# Patient Record
Sex: FEMALE | Race: WHITE | Employment: PART TIME | URBAN - METROPOLITAN AREA
[De-identification: names, ages, dates, MRNs, and addresses within clinical notes are randomized per-mention and may not be internally consistent; named-entity substitution may affect disease eponyms.]

---
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
## 2021-03-05 ENCOUNTER — PROCEDURE VISIT (OUTPATIENT)
Dept: AUDIOLOGY | Age: 21
End: 2021-03-05
Payer: COMMERCIAL

## 2021-03-05 ENCOUNTER — OFFICE VISIT (OUTPATIENT)
Dept: ENT CLINIC | Age: 21
End: 2021-03-05
Payer: COMMERCIAL

## 2021-03-05 VITALS
TEMPERATURE: 97.5 F | BODY MASS INDEX: 31.91 KG/M2 | HEIGHT: 61 IN | WEIGHT: 169 LBS | DIASTOLIC BLOOD PRESSURE: 80 MMHG | SYSTOLIC BLOOD PRESSURE: 120 MMHG

## 2021-03-05 DIAGNOSIS — Z96.22 S/P MYRINGOTOMY WITH INSERTION OF TUBE: ICD-10-CM

## 2021-03-05 DIAGNOSIS — H69.81 ETD (EUSTACHIAN TUBE DYSFUNCTION), RIGHT: ICD-10-CM

## 2021-03-05 DIAGNOSIS — H69.81 ETD (EUSTACHIAN TUBE DYSFUNCTION), RIGHT: Primary | ICD-10-CM

## 2021-03-05 PROCEDURE — G8417 CALC BMI ABV UP PARAM F/U: HCPCS | Performed by: OTOLARYNGOLOGY

## 2021-03-05 PROCEDURE — 1036F TOBACCO NON-USER: CPT | Performed by: OTOLARYNGOLOGY

## 2021-03-05 PROCEDURE — 92567 TYMPANOMETRY: CPT | Performed by: AUDIOLOGIST

## 2021-03-05 PROCEDURE — 99213 OFFICE O/P EST LOW 20 MIN: CPT | Performed by: OTOLARYNGOLOGY

## 2021-03-05 PROCEDURE — G8484 FLU IMMUNIZE NO ADMIN: HCPCS | Performed by: OTOLARYNGOLOGY

## 2021-03-05 PROCEDURE — G8427 DOCREV CUR MEDS BY ELIG CLIN: HCPCS | Performed by: OTOLARYNGOLOGY

## 2021-03-05 RX ORDER — FLUTICASONE PROPIONATE 50 MCG
2 SPRAY, SUSPENSION (ML) NASAL DAILY
Qty: 1 BOTTLE | Refills: 0 | Status: SHIPPED | OUTPATIENT
Start: 2021-03-05

## 2021-03-05 NOTE — PROGRESS NOTES
[facility] Otolaryngology  Dr. Colton Loya. Dalton Lucero. Ms.Ed        Patient Name:  Carola Mart  :  2000     CHIEF C/O:    Chief Complaint   Patient presents with    Ear Problem     3 month tube check no problems at this time       HISTORY OBTAINED FROM:  patient    HISTORY OF PRESENT ILLNESS:       Celena Luis is a 24y.o. year old female, here today for follow up of history of eustachian tube dysfunction status post open also to placement. No current complaints of difficulty swallowing shortness of breath stridor headache vision changes ear drainage. Patient can use Flonase without associated complaints of epistaxis or sore throat. Past Medical History:   Diagnosis Date    ADHD (attention deficit hyperactivity disorder)     Anxiety     Asthma     Reactive airway disease     only when sick,       Past Surgical History:   Procedure Laterality Date    MYRINGOTOMY  2004    bilaterally    MYRINGOTOMY Right 10/04/2018    MYRINGOTOMY Right 2020    MYRINGOTOMY TUBE INSERTION performed by Gianna Marrufo DO at [address] Right 2020    eustatian tube dilation with myringtomy tube insertion    HI CREATE EARDRUM OPENING,GEN ANESTH Right 10/4/2018    RIGHT MYRINGOTOMY TUBE INSERTION performed by Gianna Marrufo DO at [address] Right 2020    EUSTACHIAN TUBE DILATION - RIGHT.  WITH RIGHT MYRINGOTOMY TUBE PLACEMENT performed by Gianna Marrufo DO at [facility]       Current Outpatient Medications:     fluticasone (FLONASE) 50 MCG/ACT nasal spray, 2 sprays by Nasal route daily 2 sprays each nostril once daily, Disp: 1 Bottle, Rfl: 0    ADDERALL XR 25 MG capsule, take 1 capsule by mouth every morning, Disp: , Rfl:     ibuprofen (ADVIL;MOTRIN) 400 MG tablet, Take 400 mg by mouth every 6 hours as needed for Pain, Disp: , Rfl:     fluticasone (FLONASE) 50 MCG/ACT nasal spray, 2 sprays by Nasal route daily 2 sprays each nostril once a day, Disp: 1 Bottle, Rfl: 3    montelukast (SINGULAIR) 10 MG tablet, Take 1 tablet by mouth daily, Disp: 30 tablet, Rfl: 3    amphetamine-dextroamphetamine (ADDERALL XR) 20 MG extended release capsule, Take 20 mg by mouth every morning., Disp: , Rfl:     albuterol sulfate  (90 Base) MCG/ACT inhaler, Inhale 2 puffs into the lungs every 6 hours as needed for Wheezing Only takes as needed, Disp: , Rfl:     cetirizine (ZYRTEC) 10 MG tablet, Take 10 mg by mouth daily, Disp: , Rfl:     FLUoxetine (PROZAC) 20 MG capsule, Take 20 mg by mouth daily, Disp: , Rfl:   Other  Social History     Tobacco Use    Smoking status: Never Smoker    Smokeless tobacco: Never Used   Substance Use Topics    Alcohol use: No    Drug use: No     History reviewed. No pertinent family history. Review of Systems   Constitutional: Negative for chills and fever. HENT: Negative for congestion, ear discharge, ear pain, facial swelling and hearing loss. Respiratory: Negative for cough and shortness of breath. Cardiovascular: Negative for chest pain and palpitations. Gastrointestinal: Negative for vomiting. Skin: Negative for rash. Allergic/Immunologic: Negative for environmental allergies. Neurological: Negative for dizziness and headaches. Hematological: Does not bruise/bleed easily. All other systems reviewed and are negative. /80   Temp 97.5 °F (36.4 °C)   Ht 5' 1\" (1.549 m)   Wt 169 lb (76.7 kg)   BMI 31.93 kg/m²   Physical Exam  Vitals signs and nursing note reviewed. Constitutional:       Appearance: She is well-developed. HENT:      Head: Normocephalic and atraumatic. Right Ear: Ear canal and external ear normal. A PE tube is present. Tympanic membrane is not retracted. Tympanic membrane has normal mobility. Left Ear: Ear canal and external ear normal. No decreased hearing noted. No PE tube. Tympanic membrane is not retracted.  Tympanic membrane has normal mobility. Eyes:      Pupils: Pupils are equal, round, and reactive to light. Neck:      Musculoskeletal: Normal range of motion. Thyroid: No thyromegaly. Trachea: No tracheal deviation. Cardiovascular:      Rate and Rhythm: Normal rate. Pulmonary:      Effort: Pulmonary effort is normal. No respiratory distress. Musculoskeletal: Normal range of motion. Lymphadenopathy:      Cervical: No cervical adenopathy. Skin:     General: Skin is warm. Findings: No erythema. Neurological:      Mental Status: She is alert. Cranial Nerves: No cranial nerve deficit. IMPRESSION/PLAN:  Patient seen and examined, history of eustachian tube dysfunction with bilateral myringotomy tympanostomy placement, right tube in place and functioning normally left tube is out with normal tympanograms continue Flonase and follow-up in 3 to 4 months. Dr. Denise Gonzales.  Otolaryngology Facial Plastic Surgery  :  [facility] Otolaryngology/Facial Plastic Surgery Residency  Associate Clinical Professor:  ANTELMO Brennan  [facility]

## 2021-03-05 NOTE — PROGRESS NOTES
This patient was referred for tympanometric testing by Dr. Live Conley. Patient is being seen for a 3-month PE tube check, with no acute issues. Tympanometry revealed normal middle ear peak pressure and compliance, in the left ear. A seal could not be obtained, right ear. Ipsilateral acoustic reflexes were present, left ear and could not be tested, right ear at 1000Hz. The results were reviewed with the patient. Recommendations for follow up will be made pending physician consult.     Tracey Lombard, CCC/ABI  Audiologist  [id number]  NPI#:  [id number]

## 2021-03-19 ASSESSMENT — ENCOUNTER SYMPTOMS
FACIAL SWELLING: 0
COUGH: 0
SHORTNESS OF BREATH: 0
VOMITING: 0

## 2021-10-21 ENCOUNTER — OFFICE VISIT (OUTPATIENT)
Dept: ENT CLINIC | Age: 21
End: 2021-10-21
Payer: COMMERCIAL

## 2021-10-21 ENCOUNTER — PROCEDURE VISIT (OUTPATIENT)
Dept: AUDIOLOGY | Age: 21
End: 2021-10-21
Payer: COMMERCIAL

## 2021-10-21 VITALS
SYSTOLIC BLOOD PRESSURE: 126 MMHG | BODY MASS INDEX: 31.19 KG/M2 | HEART RATE: 99 BPM | DIASTOLIC BLOOD PRESSURE: 74 MMHG | WEIGHT: 169.5 LBS | HEIGHT: 62 IN

## 2021-10-21 DIAGNOSIS — H61.21 IMPACTED CERUMEN OF RIGHT EAR: ICD-10-CM

## 2021-10-21 DIAGNOSIS — H69.81 ETD (EUSTACHIAN TUBE DYSFUNCTION), RIGHT: ICD-10-CM

## 2021-10-21 DIAGNOSIS — H90.11 CONDUCTIVE HEARING LOSS OF RIGHT EAR WITH UNRESTRICTED HEARING OF LEFT EAR: ICD-10-CM

## 2021-10-21 DIAGNOSIS — H90.71 MIXED CONDUCTIVE AND SENSORINEURAL HEARING LOSS OF RIGHT EAR WITH UNRESTRICTED HEARING OF LEFT EAR: Primary | ICD-10-CM

## 2021-10-21 PROCEDURE — 99213 OFFICE O/P EST LOW 20 MIN: CPT | Performed by: NURSE PRACTITIONER

## 2021-10-21 PROCEDURE — G8427 DOCREV CUR MEDS BY ELIG CLIN: HCPCS | Performed by: NURSE PRACTITIONER

## 2021-10-21 PROCEDURE — G8417 CALC BMI ABV UP PARAM F/U: HCPCS | Performed by: NURSE PRACTITIONER

## 2021-10-21 PROCEDURE — G8484 FLU IMMUNIZE NO ADMIN: HCPCS | Performed by: NURSE PRACTITIONER

## 2021-10-21 PROCEDURE — 92567 TYMPANOMETRY: CPT | Performed by: AUDIOLOGIST

## 2021-10-21 PROCEDURE — 1036F TOBACCO NON-USER: CPT | Performed by: NURSE PRACTITIONER

## 2021-10-21 PROCEDURE — 92557 COMPREHENSIVE HEARING TEST: CPT | Performed by: AUDIOLOGIST

## 2021-10-21 RX ORDER — NORGESTIMATE AND ETHINYL ESTRADIOL 7DAYSX3 28
1 KIT ORAL
COMMUNITY

## 2021-10-21 RX ORDER — CLONIDINE HYDROCHLORIDE 0.1 MG/1
TABLET ORAL
COMMUNITY
Start: 2021-09-22

## 2021-10-21 NOTE — PROGRESS NOTES
[address] [facility] Otolaryngology  Dr. Alen Houser. Gareth Castellon. Ms.Ed        Patient Name:  Shima Sinclair  :  2000     CHIEF C/O:    Chief Complaint   Patient presents with    Other     Possible RT tube not in place, possible impac akshat cerumen, c/o dizziness, pain and loss of hearing,        HISTORY OBTAINED FROM:  patient, father    HISTORY OF PRESENT ILLNESS:       Felix Edgar is a 22y.o. year old female, here today for follow up of right ear fullness. Pain with fullness and muffled hearing in right ear for 1 month  Hx of tube in right ear from 1 year ago  Thinks it may be out  No drainage  No fevers or recent antibiotics  No hx of previous wax impactions  Ringing in right ear          Past Medical History:   Diagnosis Date    ADHD (attention deficit hyperactivity disorder)     Anxiety     Asthma     Reactive airway disease     only when sick,       Past Surgical History:   Procedure Laterality Date    MYRINGOTOMY  2004    bilaterally    MYRINGOTOMY Right 10/04/2018    MYRINGOTOMY Right 2020    MYRINGOTOMY TUBE INSERTION performed by Everardo Steen DO at [place] [address] Right 2020    eustatian tube dilation with myringtomy tube insertion    MT CREATE EARDRUM OPENING,GEN ANESTH Right 10/4/2018    RIGHT MYRINGOTOMY TUBE [facility] [address]    EUSTACHIAN TUBE DILATION - RIGHT.  WITH RIGHT MYRINGOTOMY TUBE PLACEMENT performed by Everardo Steen DO at [address] [place] [address]       Current Outpatient Medications:     Norgestim-Eth Estrad Triphasic (TRI-SPRINTEC) 0.18/0.215/0.25 MG-35 MCG TABS, Take 1 tablet by mouth, Disp: , Rfl:     cloNIDine (CATAPRES) 0.1 MG tablet, Take one half in AM and one and one half at bed time, Disp: , Rfl:     fluticasone (FLONASE) 50 MCG/ACT nasal spray, 2 sprays by Nasal route daily 2 sprays each nostril once daily, Disp: 1 Bottle, Rfl: 0    ADDERALL XR 30 MG capsule, 1 capsule. , Disp: , Rfl:     ibuprofen (ADVIL;MOTRIN) 400 MG tablet, Take 400 mg by mouth every 6 hours as needed for Pain, Disp: , Rfl:     montelukast (SINGULAIR) 10 MG tablet, Take 1 tablet by mouth daily, Disp: 30 tablet, Rfl: 3    albuterol sulfate  (90 Base) MCG/ACT inhaler, Inhale 2 puffs into the lungs every 6 hours as needed for Wheezing Only takes as needed, Disp: , Rfl:     cetirizine (ZYRTEC) 10 MG tablet, Take 10 mg by mouth daily, Disp: , Rfl:     fluticasone (FLONASE) 50 MCG/ACT nasal spray, 2 sprays by Nasal route daily 2 sprays each nostril once a day (Patient not taking: Reported on 10/21/2021), Disp: 1 Bottle, Rfl: 3    amphetamine-dextroamphetamine (ADDERALL XR) 20 MG extended release capsule, Take 20 mg by mouth every morning. (Patient not taking: Reported on 10/21/2021), Disp: , Rfl:     FLUoxetine (PROZAC) 20 MG capsule, Take 20 mg by mouth daily (Patient not taking: Reported on 10/21/2021), Disp: , Rfl:   Other  Social History     Tobacco Use    Smoking status: Never Smoker    Smokeless tobacco: Never Used   Vaping Use    Vaping Use: Never used   Substance Use Topics    Alcohol use: No    Drug use: No     History reviewed. No pertinent family history. Review of Systems   Constitutional: Negative. Negative for activity change and appetite change. HENT: Positive for ear pain and hearing loss. Negative for congestion, ear discharge, postnasal drip and rhinorrhea. Eyes: Negative. Respiratory: Negative. Negative for shortness of breath and stridor. Cardiovascular: Negative. Negative for chest pain and palpitations. Endocrine: Negative. Musculoskeletal: Negative. Skin: Negative. Neurological: Negative. Negative for dizziness. Hematological: Negative. Psychiatric/Behavioral: Negative.         /74   Pulse 99   Ht 5' 1.5\" (1.562 m)   Wt 169 lb 8 oz (76.9 kg)   BMI 31.51 kg/m²   Physical Exam  Constitutional:       Appearance: was removed without difficulty revealing patent PE tube in the right TM. Patient does exhibit right TMJ tenderness worse with movement of her jaw. At this time she will begin using warm compresses and NSAID medications for her TMJ discomfort. She will be referred to Dr. 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 She will follow up in 3 months. She will call for any new or worsening symptoms prior to her appointment with Dr. Marlyn Loyola.         BEATRIZ Peñaloza, FNP-C  [address] [place] [address], Nose and Throat    The information contained in this note has been dictated using drug and medical speech recognition software and may contain errors

## 2021-11-01 ASSESSMENT — ENCOUNTER SYMPTOMS
RESPIRATORY NEGATIVE: 1
RHINORRHEA: 0
SHORTNESS OF BREATH: 0
EYES NEGATIVE: 1
STRIDOR: 0

## 2021-11-07 NOTE — PROGRESS NOTES
NEW PATIENT VISIT  Chief Complaint   Patient presents with    Hearing Problem     new patient with hearing loss in right ear      History of Present Illness:     Maged Ramirez is a 22 y.o. female presenting with concern for right hearing loss and ETD; long history of ear issues, mostly in the right; noted to have had 5 sets of tubes in the right ear and 1 in the left; she has noticeable issues with hearing in her right ear. Last right tube was placed with eustachian tube dilation and has been in place since 02/2021. She has a constant runny nose and does not use any allergy medication    PMH: bilateral ETD, history of multiple sets of ear tubes               TOBACCO  Social History     Tobacco Use   Smoking Status Never Smoker   Smokeless Tobacco Never Used        ALCOHOL   Social History     Substance and Sexual Activity   Alcohol Use No        [address]   Social History     Substance and Sexual Activity   Drug Use No        CURRENT OUTPATIENT MEDICATIONS:   Outpatient Medications Marked as Taking for the 11/8/21 encounter (Office Visit) with Adarsh Meraz MD   Medication Sig Dispense Refill    Norgestim-Eth Estrad Triphasic (TRI-SPRINTEC) 0.18/0.215/0.25 MG-35 MCG TABS Take 1 tablet by mouth      cloNIDine (CATAPRES) 0.1 MG tablet Take one half in AM and one and one half at bed time      fluticasone (FLONASE) 50 MCG/ACT nasal spray 2 sprays by Nasal route daily 2 sprays each nostril once daily 1 Bottle 0    ADDERALL XR 30 MG capsule 1 capsule.        ibuprofen (ADVIL;MOTRIN) 400 MG tablet Take 400 mg by mouth every 6 hours as needed for Pain      montelukast (SINGULAIR) 10 MG tablet Take 1 tablet by mouth daily 30 tablet 3    albuterol sulfate  (90 Base) MCG/ACT inhaler Inhale 2 puffs into the lungs every 6 hours as needed for Wheezing Only takes as needed      cetirizine (ZYRTEC) 10 MG tablet Take 10 mg by mouth daily          ALLERGIES:   Allergies   Allergen Reactions    Other      Flu shot reaction , paralysis to arm       Timing Age of Onset childhood   Duration Increasing in Severity No   Days of work missed in last year n/a      Modifying Factors Seasonal variation No        Associated Symptoms Mouth breathing No    Communication concerns No    Halitosis No     Family History Family members with similar conditions No   Family history of bleeding concerns No   Family history of anesthia concerns No        Review of Symptoms:  I have reviewed the patient's medical history in detail; there are no changes to the history as noted in the electronic medical record. Temp 97.9 °F (36.6 °C)   Ht 5' 1.5\" (1.562 m)   Wt 173 lb (78.5 kg)   BMI 32.16 kg/m²     Physical Exam    Allergies Allergies   Allergen Reactions    Other      Flu shot reaction , paralysis to arm      Constitutional Retractions/cyanosis {No     Head and Face   Lesions or masses No  facies symmetrical Yes   Eyes Ocular motion with gaze alignment Yes   Ears Inspection: Scars, lesions or masses No   Otoscopy  EAC patent bilaterally without occlusion External ears normal. Canals clear. TM normal left; right with T-tube in place and TM appears healthy      Nasal Inspection    No external Scars, lesions or masses    Pyriform apertures widely patent    Nasal musosa healthy   Septum Midline, no Septal Perforation, no septal hematoma   Turbinates Intact, healthy    Oral Cavity Lips no lesions    Teeth healthy without cavities    Gums no lesions    Oral mucosa healthy    Hard and Soft Palate no lesions    Uvula single fid     Tongue no lesions    Tonsils absent bilaterally Symmetric without exudate   Neck . Neck supple without tenderness or crepitus   Lymph  Cranial Nerve exam No palpable adenopathy  Grossly intact. CN VII symmetrical   Respiration Symmetric without Increased work of breathing    Cardiovacular No Cyanosis    Skin healthy   Diagnostics    Test ordered No orders of the defined types were placed in this encounter.      Review of existing tests Lab Results   Component Value Date/Time    WBC 7.2 07/18/2011 06:45 PM    HGB 12.6 07/18/2011 06:45 PM    HCT 36.0 07/18/2011 06:45 PM     07/18/2011 06:45 PM    MCV 84.6 07/18/2011 06:45 PM    MCH 29.6 07/18/2011 06:45 PM    MCHC 35.0 07/18/2011 06:45 PM    RDW 12.2 07/18/2011 06:45 PM        Old records  Reviewed   Discussion with other providers    Done     On this date 11/8/2021 I have spent 10 minutes reviewing previous notes, test results and 30 minutes face to face with the patient discussing the diagnosis and importance of compliance with the treatment plan as well as documenting on the day of the visit. A/P  Impression / Plan:     Alyson Mortensen is a 22 y.o.  female with right low frequency conductive hearing lossconfirmed by audiogram and examination, who will benefit from imaging.  The rest of the exam was benign      Patient will benefit from CT temporal bone  She is very interested in a right hearing aid, and will have to see if she can be a candidate as the rest of her hearing is normal  Allergy referral   Plan based on the results of the testing   Old records were reviewed     Lianne Inman MD 11/7/21 1:22 PM EST

## 2021-11-08 ENCOUNTER — OFFICE VISIT (OUTPATIENT)
Dept: ENT CLINIC | Age: 21
End: 2021-11-08
Payer: COMMERCIAL

## 2021-11-08 ENCOUNTER — TELEPHONE (OUTPATIENT)
Dept: ENT CLINIC | Age: 21
End: 2021-11-08

## 2021-11-08 VITALS — HEIGHT: 62 IN | BODY MASS INDEX: 31.83 KG/M2 | TEMPERATURE: 97.9 F | WEIGHT: 173 LBS

## 2021-11-08 DIAGNOSIS — H90.2 CONDUCTIVE HEARING LOSS, UNSPECIFIED LATERALITY: Primary | ICD-10-CM

## 2021-11-08 DIAGNOSIS — H69.83 DYSFUNCTION OF BOTH EUSTACHIAN TUBES: ICD-10-CM

## 2021-11-08 PROCEDURE — 99215 OFFICE O/P EST HI 40 MIN: CPT | Performed by: OTOLARYNGOLOGY

## 2021-11-08 NOTE — PATIENT INSTRUCTIONS
Will fax allergy order to , they will call for appointment. Address: [address]  Phone: [phone]        Call [phone] after CT is done for tele-visit results.

## 2021-11-08 NOTE — TELEPHONE ENCOUNTER
[facility]. They are to contact patient with appointment. Also faxed allergy referral to Dr. Rachana Lloyd, they too are to reach out to patient to schedule appointment.

## 2021-11-16 ENCOUNTER — HOSPITAL ENCOUNTER (OUTPATIENT)
Dept: CT IMAGING | Age: 21
Discharge: HOME | End: 2021-11-16
Payer: COMMERCIAL

## 2021-11-16 DIAGNOSIS — H90.2 CONDUCTIVE HEARING LOSS, UNSPECIFIED LATERALITY: ICD-10-CM

## 2021-11-16 PROCEDURE — 70480 CT ORBIT/EAR/FOSSA W/O DYE: CPT

## 2021-11-18 ENCOUNTER — TELEPHONE (OUTPATIENT)
Dept: ENT CLINIC | Age: 21
End: 2021-11-18

## 2021-12-14 ENCOUNTER — TELEPHONE (OUTPATIENT)
Dept: ENT CLINIC | Age: 21
End: 2021-12-14

## 2021-12-14 NOTE — TELEPHONE ENCOUNTER
Dr Braulio Hinojosa office called, pt was scheduled for appt on 12/6, however they cancelled, since then tried to reach out 3 times with no call back, they balwinder no longer be calling to schedule appt.       Electronically signed by Garth Lemus on 12/14/2021 at 2:34 PM

## 2023-09-11 ENCOUNTER — PROCEDURE VISIT (OUTPATIENT)
Dept: AUDIOLOGY | Age: 23
End: 2023-09-11
Payer: COMMERCIAL

## 2023-09-11 ENCOUNTER — OFFICE VISIT (OUTPATIENT)
Dept: ENT CLINIC | Age: 23
End: 2023-09-11
Payer: COMMERCIAL

## 2023-09-11 VITALS
BODY MASS INDEX: 33.27 KG/M2 | HEART RATE: 83 BPM | SYSTOLIC BLOOD PRESSURE: 129 MMHG | DIASTOLIC BLOOD PRESSURE: 85 MMHG | WEIGHT: 180.8 LBS | HEIGHT: 62 IN

## 2023-09-11 DIAGNOSIS — H90.71 MIXED CONDUCTIVE AND SENSORINEURAL HEARING LOSS OF RIGHT EAR WITH UNRESTRICTED HEARING OF LEFT EAR: Primary | ICD-10-CM

## 2023-09-11 DIAGNOSIS — H91.8X9 ASYMMETRICAL HEARING LOSS: ICD-10-CM

## 2023-09-11 DIAGNOSIS — H90.3 SENSORINEURAL HEARING LOSS, ASYMMETRICAL: ICD-10-CM

## 2023-09-11 DIAGNOSIS — H69.81 ETD (EUSTACHIAN TUBE DYSFUNCTION), RIGHT: ICD-10-CM

## 2023-09-11 DIAGNOSIS — Z45.89 TYMPANOSTOMY TUBE CHECK: ICD-10-CM

## 2023-09-11 DIAGNOSIS — H69.81 CHRONIC DYSFUNCTION OF RIGHT EUSTACHIAN TUBE: Primary | ICD-10-CM

## 2023-09-11 PROCEDURE — 92567 TYMPANOMETRY: CPT | Performed by: AUDIOLOGIST

## 2023-09-11 PROCEDURE — G8417 CALC BMI ABV UP PARAM F/U: HCPCS | Performed by: NURSE PRACTITIONER

## 2023-09-11 PROCEDURE — 1036F TOBACCO NON-USER: CPT | Performed by: NURSE PRACTITIONER

## 2023-09-11 PROCEDURE — 99214 OFFICE O/P EST MOD 30 MIN: CPT | Performed by: NURSE PRACTITIONER

## 2023-09-11 PROCEDURE — 92557 COMPREHENSIVE HEARING TEST: CPT | Performed by: AUDIOLOGIST

## 2023-09-11 PROCEDURE — G8427 DOCREV CUR MEDS BY ELIG CLIN: HCPCS | Performed by: NURSE PRACTITIONER

## 2023-09-11 RX ORDER — ESCITALOPRAM OXALATE 20 MG/1
1 TABLET ORAL DAILY
COMMUNITY
Start: 2023-09-07

## 2023-09-11 ASSESSMENT — ENCOUNTER SYMPTOMS
RESPIRATORY NEGATIVE: 1
SHORTNESS OF BREATH: 0
STRIDOR: 0
EYES NEGATIVE: 1
RHINORRHEA: 0

## 2023-09-11 NOTE — PROGRESS NOTES
This patient was referred for audiometric and tympanometric testing by HANY Izaguirre due to a longstanding issue with chronic ETD, right ear and a decrease in hearing sensitivity, right ear. Patient has had a PE tube in the right ear, for several years. Patient is interested in a hearing aid, right ear,  if warranted. Audiometry using pure tone air and bone conduction testing revealed normal hearing sensitivity, left ear and borderline-normal-to-mild hearing loss, right ear. Reliability was good. Speech reception thresholds were in good agreement with the pure tone averages, bilaterally. Speech discrimination scores were 96%, right ear at 60dBHL and 100%, left ear at 45dBHL. Tympanometry revealed normal middle ear peak pressure and compliance, in the left ear. A seal could not be obtained, right ear. Ipsilateral acoustic reflexes were present, left ear and could not be tested, right ear at 1000Hz. The results were reviewed with the patient. Recommendations for follow up will be made pending physician consult.     Pineda Mariano CCC/ABI  Audiologist  [id number]  NPI#:  [id number]      Electronically signed by Jono Palacio on 9/11/2023 at 10:41 AM

## 2023-09-11 NOTE — PROGRESS NOTES
[facility]  Dr. Roni Cannon. Ms.Ed        Patient Name:  Perla Thapa  :  2000     CHIEF C/O:    Chief Complaint   Patient presents with    Follow-up     Patient for follow up on right hearing loss patient states that she has had severe right ear pain patient states that she has severe right ear drainage from right ear patient has more nasal drainage patient states that she has pain daily        HISTORY OBTAINED FROM:  patient    HISTORY OF PRESENT ILLNESS:       Roxaan Lozano is a 26y.o. year old female, here today for follow up of:       Hearing loss and eustachian tube dysfunction. Patient was last seen nearly 2 years ago by Dr. Jenni Vegas for her mixed conductive hearing loss. She states that since that time she has noticed a continuous decline in the hearing of the right ear. She denies any current pain in the ear however she does state several weeks ago she was having discomfort in the right ear. She has noticed intermittent drainage in the right ear and is unsure if the tube was placed nearly 3 years ago is still in place. She states that she is having increased difficulty hearing routinely increasing the volume on her radio and television. Is also more difficult to have conversations with people. Patient is interested in hearing aids even if pain out-of-pocket. She denies any dizziness.   She does have intermittent high-frequency nonpulsatile tinnitus           Past Medical History:   Diagnosis Date    ADHD (attention deficit hyperactivity disorder)     Anxiety     Asthma     Reactive airway disease     only when sick,       Past Surgical History:   Procedure Laterality Date    MYRINGOTOMY  2004    bilaterally    MYRINGOTOMY Right 10/04/2018    MYRINGOTOMY Right 2020    MYRINGOTOMY TUBE INSERTION performed by Dwain Guillen DO at [address] [place] Right 2020    eustatian tube dilation with myringtomy tube insertion    AK TYMPANOSTOMY

## 2023-10-13 NOTE — PROGRESS NOTES
CC:   Chief Complaint   Patient presents with    Follow-up     Discuss tubes/ MRI results      Maureen Milan is a 26 y.o. female last seen 12/2021 with progressive hearing loss;   Prior CT scan noted to have small debris in the middle ear space; MRI no IAC pathology on review, small left artefact per radiology, still has tube in the right ear for 3 years. Has not seen allergist yet                  PMH: 22 y.o. female presenting with concern for right hearing loss and ETD; long history of ear issues, mostly in the right; noted to have had 5 sets of tubes in the right ear and 1 in the left; she has noticeable issues with hearing in her right ear. Last right tube was placed with eustachian tube dilation and has been in place since 02/2021. She has a constant runny nose and does not use any allergy medication     PMH: bilateral ETD, history of multiple sets of ear tubes          PAST MEDICAL HISTORY:   Past Medical History:   Diagnosis Date    ADHD (attention deficit hyperactivity disorder)     Anxiety     Asthma     Reactive airway disease     only when sick,          PAST SURGICAL HISTORY:   Past Surgical History:   Procedure Laterality Date    MYRINGOTOMY  2004    bilaterally    MYRINGOTOMY Right 10/04/2018    MYRINGOTOMY Right 7/23/2020    MYRINGOTOMY TUBE INSERTION performed by Robb Garcia DO at [address] [place] Right 07/23/2020    eustatian tube dilation with myringtomy tube insertion    MI TYMPANOSTOMY GENERAL ANESTHESIA Right 10/4/2018    RIGHT MYRINGOTOMY TUBE INSERTION performed by Robb Garcia DO at [facility] Right 7/23/2020    EUSTACHIAN TUBE DILATION - RIGHT.  WITH RIGHT MYRINGOTOMY TUBE PLACEMENT performed by Robb Garcia DO at [address]:   Social History     Socioeconomic History    Marital status: Single     Spouse name: Not on file    Number of children: Not on file    Years of education: Not on

## 2023-10-16 ENCOUNTER — OFFICE VISIT (OUTPATIENT)
Dept: ENT CLINIC | Age: 23
End: 2023-10-16
Payer: COMMERCIAL

## 2023-10-16 VITALS
WEIGHT: 185 LBS | BODY MASS INDEX: 34.04 KG/M2 | RESPIRATION RATE: 18 BRPM | HEIGHT: 62 IN | DIASTOLIC BLOOD PRESSURE: 60 MMHG | HEART RATE: 80 BPM | OXYGEN SATURATION: 100 % | SYSTOLIC BLOOD PRESSURE: 109 MMHG

## 2023-10-16 DIAGNOSIS — H91.8X3 ASYMMETRICAL HEARING LOSS: Primary | ICD-10-CM

## 2023-10-16 DIAGNOSIS — H69.91 ETD (EUSTACHIAN TUBE DYSFUNCTION), RIGHT: ICD-10-CM

## 2023-10-16 PROCEDURE — G8417 CALC BMI ABV UP PARAM F/U: HCPCS | Performed by: OTOLARYNGOLOGY

## 2023-10-16 PROCEDURE — G8427 DOCREV CUR MEDS BY ELIG CLIN: HCPCS | Performed by: OTOLARYNGOLOGY

## 2023-10-16 PROCEDURE — 99215 OFFICE O/P EST HI 40 MIN: CPT | Performed by: OTOLARYNGOLOGY

## 2023-10-16 PROCEDURE — G8484 FLU IMMUNIZE NO ADMIN: HCPCS | Performed by: OTOLARYNGOLOGY

## 2023-10-16 PROCEDURE — 99417 PROLNG OP E/M EACH 15 MIN: CPT | Performed by: OTOLARYNGOLOGY

## 2023-10-16 PROCEDURE — 1036F TOBACCO NON-USER: CPT | Performed by: OTOLARYNGOLOGY

## 2023-10-16 RX ORDER — DIPHENHYDRAMINE HCL 25 MG
25 CAPSULE ORAL EVERY 6 HOURS PRN
COMMUNITY

## 2023-10-16 NOTE — PATIENT INSTRUCTIONS
Call insurance and verify hearing aid coverage and where it can be used. Once the allergiest calls and schedules you, call the office and we will see you 3 months after.

## 2023-10-17 ENCOUNTER — TELEPHONE (OUTPATIENT)
Dept: AUDIOLOGY | Age: 23
End: 2023-10-17

## 2023-10-17 NOTE — TELEPHONE ENCOUNTER
Patient was seen by [facility] 9/11 and by Otology 10/16. She is interested in getting hearing aids at [facility]. She reported she has insurance coverage, but did not ask where she could go for hearing aids. She will call her insurance and ask, and then call [facility] to schedule HAE.

## 2023-10-26 ENCOUNTER — TELEPHONE (OUTPATIENT)
Dept: AUDIOLOGY | Age: 23
End: 2023-10-26

## 2023-10-26 NOTE — TELEPHONE ENCOUNTER
Returned patient phone call regarding hearing aid acquisition process. Explained to patient that she will need to check her insurance, regarding permitted hearing aid providers, amount of coverage. Told patient that if  is an approved provider, she will need documentation of coverage and amount of coverage. Patient instructed to call [facility], with any questions, or if audiology information is needed, for hearing aid evaluation purposes, at an alternate site.     [place], CCC/A  Audiologist  [id number]  NPI#:  [id number]

## 2023-11-13 ENCOUNTER — PROCEDURE VISIT (OUTPATIENT)
Dept: AUDIOLOGY | Age: 23
End: 2023-11-13

## 2023-11-13 DIAGNOSIS — H69.91 ETD (EUSTACHIAN TUBE DYSFUNCTION), RIGHT: Primary | ICD-10-CM

## 2023-11-13 PROCEDURE — 99024 POSTOP FOLLOW-UP VISIT: CPT | Performed by: AUDIOLOGIST

## 2023-11-14 NOTE — PROGRESS NOTES
HEARING AID EVALUATION    Patient seen for hearing aid evaluation/selection. After consultation, the patient would like to try [employer] BIANKA hearing aids. The patient has commercial insurance. Hearing aids will be ordered. The patient was scheduled for a hearing aid fitting on 12/5/2023. Hearing aid order placed and confirmation scanned.     Ashly Lomeli CCC/ABI  Audiologist  [id number]  NPI#:  [id number]

## 2023-12-05 ENCOUNTER — PROCEDURE VISIT (OUTPATIENT)
Dept: AUDIOLOGY | Age: 23
End: 2023-12-05

## 2023-12-05 DIAGNOSIS — H90.3 SENSORINEURAL HEARING LOSS, BILATERAL: Primary | ICD-10-CM

## 2023-12-05 PROCEDURE — 99024 POSTOP FOLLOW-UP VISIT: CPT | Performed by: AUDIOLOGIST

## 2023-12-07 NOTE — PROGRESS NOTES
Fit with binaural  RITE  hearing aids. Instructed in use and care. Gave  battery charger, warranty information and scheduled  2 week check. Made following adjustments: N/A. Hearing aid contract/battery warning form reviewed and signed. Hearing aids paired to phone nevin. Patient was satisfied and will follow up on above date, unless problems arise.      Olinda Amador CCC/ABI  Audiologist  [id number]  NPI#:  [id number]      Electronically signed by Jono Harman on 12/7/2023 at 7:50 AM

## 2024-01-12 ENCOUNTER — PROCEDURE VISIT (OUTPATIENT)
Dept: AUDIOLOGY | Age: 24
End: 2024-01-12

## 2024-01-12 DIAGNOSIS — H69.91 ETD (EUSTACHIAN TUBE DYSFUNCTION), RIGHT: Primary | ICD-10-CM

## 2024-01-12 PROCEDURE — 99024 POSTOP FOLLOW-UP VISIT: CPT | Performed by: AUDIOLOGIST

## 2024-01-12 NOTE — PROGRESS NOTES
Patient seen for hearing aid check.  Requested slight increase in low frequency gain, due to some difficult with male voices.  Patient scheduled for 30 day hearing aid check on 2/2/24, in conjunction with billing.    Jarvis Grant CCC/A  Audiologist  [id number]  NPI#:  [id number]

## 2024-02-02 ENCOUNTER — PROCEDURE VISIT (OUTPATIENT)
Dept: AUDIOLOGY | Age: 24
End: 2024-02-02

## 2024-02-02 ENCOUNTER — TELEPHONE (OUTPATIENT)
Dept: AUDIOLOGY | Age: 24
End: 2024-02-02

## 2024-02-02 DIAGNOSIS — H90.0 CONDUCTIVE HEARING LOSS, BILATERAL: Primary | ICD-10-CM

## 2024-02-02 NOTE — TELEPHONE ENCOUNTER
Patient reported acute ear pain, right ear, that began about 2-3 days ago.  She feels her PE tube has fallen out.  She is requesting an ENT appointment, with Dr Morejon, at the [facility] site.  This information was forwarded to [facility] office staff.    Please call to schedule appointment.    Jarvis Grant CCC/A  Audiologist  [id number]  NPI#:  [id number]

## 2024-02-02 NOTE — PROGRESS NOTES
The patient came in for a 30 day hearing aid follow up with billing.  Patient reported they are doing well with the hearing aids.   Changes to hearing aids today: N/A. Counseled patient on: cleaning/changing domes/wax guards. Patient is satisfied with the hearing aids and therefore billing to be done today.    Patient to return as needed.    Patient reported acute ear pain, right ear, that began about 2-3 days ago.  She feels her PE tube has fallen out.  She is requesting an ENT appointment, with Dr Morejon, at the [facility] site.  This information was forwarded to [facility]NT office staff.    Jarvis Grant CCC/ABI  Audiologist  [id number]  NPI#:  [id number]      Electronically signed by Jono Dumas on 2/2/2024 at 11:50 AM

## 2024-02-06 NOTE — TELEPHONE ENCOUNTER
Called pt and scheduled pt with Dr. ALFREDO in March after review chart I advised pt she would need to call [facility] and schedule a appointment with Dr. Duval due to her being a patient of Dr. Duval. Pt understood. Canceled appointment with Dr. ALFREDO .

## 2024-03-11 ENCOUNTER — TELEPHONE (OUTPATIENT)
Dept: AUDIOLOGY | Age: 24
End: 2024-03-11

## 2024-03-11 NOTE — TELEPHONE ENCOUNTER
Returned patient call to troubleshoot right hearing aid.  Walked patient through changing the wax guard on the right hearing aid.  Still no sound.  Walked through resetting the hearing aid.  Still no sound.    Jarvis Grant CCC/A  Audiologist  [id number]  NPI#:  [id number]

## 2024-03-12 ENCOUNTER — PROCEDURE VISIT (OUTPATIENT)
Dept: AUDIOLOGY | Age: 24
End: 2024-03-12

## 2024-03-12 DIAGNOSIS — H69.91 ETD (EUSTACHIAN TUBE DYSFUNCTION), RIGHT: ICD-10-CM

## 2024-03-12 DIAGNOSIS — H90.0 CONDUCTIVE HEARING LOSS, BILATERAL: Primary | ICD-10-CM

## 2024-03-12 PROCEDURE — 99024 POSTOP FOLLOW-UP VISIT: CPT | Performed by: AUDIOLOGIST

## 2024-03-13 NOTE — PROGRESS NOTES
Patient seen for hearing aid check.  Right hearing aid not working.  Sent to [employer] for repair/replacement.    Jarvis Grant CCC/A  Audiologist  [id number]  NPI#:  [id number]

## 2024-04-01 ENCOUNTER — TELEPHONE (OUTPATIENT)
Dept: AUDIOLOGY | Age: 24
End: 2024-04-01

## 2024-04-01 NOTE — TELEPHONE ENCOUNTER
called patient and attempted to leave a voicemail message, regarding hearing aid repair pickup appointment.  Tried to leave a voicemail, however voicemail box is not set up.    Electronically signed by Jono Dumas on 4/1/2024 at 10:40 AM

## 2024-04-25 ENCOUNTER — PROCEDURE VISIT (OUTPATIENT)
Dept: AUDIOLOGY | Age: 24
End: 2024-04-25

## 2024-04-25 DIAGNOSIS — H90.A11 CONDUCTIVE HEARING LOSS OF RIGHT EAR WITH RESTRICTED HEARING OF LEFT EAR: Primary | ICD-10-CM

## 2024-04-25 PROCEDURE — 99024 POSTOP FOLLOW-UP VISIT: CPT | Performed by: AUDIOLOGIST

## 2024-04-25 NOTE — PROGRESS NOTES
Patient seen for hearing aid repair pickup (Phonak/right HA).    Hearing aids resynced to each other and phone nevin.    Patient to return PRN.    Jarvis Grant CCC/ABI  Audiologist  [id number]  NPI#:  [id number]

## 2024-04-29 ENCOUNTER — PROCEDURE VISIT (OUTPATIENT)
Dept: AUDIOLOGY | Age: 24
End: 2024-04-29

## 2024-04-29 DIAGNOSIS — H69.91 ETD (EUSTACHIAN TUBE DYSFUNCTION), RIGHT: Primary | ICD-10-CM

## 2024-04-29 PROCEDURE — 99024 POSTOP FOLLOW-UP VISIT: CPT | Performed by: AUDIOLOGIST

## 2024-04-29 NOTE — PROGRESS NOTES
Patient seen for programming adjustments to her right hearing aid.    No other issues noted.    Patient to return PRN.    Jarvis Grant CCC/A  Audiologist  [id number]  NPI#:  [id number]

## 2024-05-23 ENCOUNTER — PROCEDURE VISIT (OUTPATIENT)
Dept: AUDIOLOGY | Age: 24
End: 2024-05-23
Payer: COMMERCIAL

## 2024-05-23 ENCOUNTER — OFFICE VISIT (OUTPATIENT)
Dept: ENT CLINIC | Age: 24
End: 2024-05-23
Payer: COMMERCIAL

## 2024-05-23 VITALS
HEART RATE: 94 BPM | HEIGHT: 62 IN | WEIGHT: 154.4 LBS | SYSTOLIC BLOOD PRESSURE: 115 MMHG | DIASTOLIC BLOOD PRESSURE: 84 MMHG | BODY MASS INDEX: 28.41 KG/M2

## 2024-05-23 DIAGNOSIS — H69.91 ETD (EUSTACHIAN TUBE DYSFUNCTION), RIGHT: Primary | ICD-10-CM

## 2024-05-23 DIAGNOSIS — H91.8X3 ASYMMETRICAL HEARING LOSS: ICD-10-CM

## 2024-05-23 DIAGNOSIS — H90.71 MIXED CONDUCTIVE AND SENSORINEURAL HEARING LOSS OF RIGHT EAR WITH UNRESTRICTED HEARING OF LEFT EAR: ICD-10-CM

## 2024-05-23 DIAGNOSIS — Z01.818 PREOP TESTING: ICD-10-CM

## 2024-05-23 DIAGNOSIS — R55 SYNCOPE, UNSPECIFIED SYNCOPE TYPE: ICD-10-CM

## 2024-05-23 DIAGNOSIS — R42 VERTIGO: ICD-10-CM

## 2024-05-23 PROCEDURE — 92567 TYMPANOMETRY: CPT | Performed by: AUDIOLOGIST

## 2024-05-23 PROCEDURE — G8427 DOCREV CUR MEDS BY ELIG CLIN: HCPCS | Performed by: NURSE PRACTITIONER

## 2024-05-23 PROCEDURE — 99214 OFFICE O/P EST MOD 30 MIN: CPT | Performed by: NURSE PRACTITIONER

## 2024-05-23 PROCEDURE — 92542 POSITIONAL NYSTAGMUS TEST: CPT | Performed by: NURSE PRACTITIONER

## 2024-05-23 PROCEDURE — 1036F TOBACCO NON-USER: CPT | Performed by: NURSE PRACTITIONER

## 2024-05-23 PROCEDURE — G8417 CALC BMI ABV UP PARAM F/U: HCPCS | Performed by: NURSE PRACTITIONER

## 2024-05-23 ASSESSMENT — ENCOUNTER SYMPTOMS
EYES NEGATIVE: 1
RESPIRATORY NEGATIVE: 1
SHORTNESS OF BREATH: 0
STRIDOR: 0
RHINORRHEA: 0

## 2024-05-23 NOTE — PROGRESS NOTES
This patient was referred for tympanometric testing by HANY Perez due to eustachian tube dysfunction, right ear worse.      Tympanometry revealed normal middle ear peak pressure and compliance, bilaterally.    The results were reviewed with the patient.     Recommendations for follow up will be made pending physician consult.    Jarvis Grant CCC/ABI  Audiologist  [id number]  NPI#:  [id number]      Electronically signed by Jono Dumas on 5/23/2024 at 8:47 AM

## 2024-05-23 NOTE — PROGRESS NOTES
[facility]  Dr. Gonzalez Morejon D.O. Ms.Ed        Patient Name:  Dennis Mendez  :  2000     CHIEF C/O:    Chief Complaint   Patient presents with    Follow-up     FOLLOW UP EARS PATIENT WAS REFERRED TO DR OSHEA       HISTORY OBTAINED FROM:  patient    HISTORY OF PRESENT ILLNESS:       Dennis is a 23 y.o. year old female, here today for follow up of:       Chronic eustachian tube dysfunction hearing loss, dizziness.  Patient was last seen by Dr. Oshea approximately 7-8 months ago at which time they discussed possible removal of the right myringotomy tube.  Patient feels that the tube is out at this time but denies any ear pain or pressure.  She denies any new changes to her hearing is doing well with her current set of hearing aids.  She denies any congestion, rhinorrhea, postnasal drainage.  She does note the return of her symptoms of dizziness.  She states that it is a lightheaded sensation or faint sensation that happens when her arms are placed above her head and she stretches, also when changing positions from lying or sitting to standing at times.  She denies any loss of consciousness.  She denies any room spinning vertigo.  She denies any new tinnitus.           Past Medical History:   Diagnosis Date    ADHD (attention deficit hyperactivity disorder)     Anxiety     Asthma     Reactive airway disease     only when sick,       Past Surgical History:   Procedure Laterality Date    MYRINGOTOMY  2004    bilaterally    MYRINGOTOMY Right 10/04/2018    MYRINGOTOMY Right 2020    MYRINGOTOMY TUBE INSERTION performed by Gonzalez Morejon DO at [facility] OR    OTHER SURGICAL HISTORY Right 2020    eustatian tube dilation with myringtomy tube insertion    ID TYMPANOSTOMY GENERAL ANESTHESIA Right 10/4/2018    RIGHT MYRINGOTOMY TUBE INSERTION performed by Gonzalez Morejon DO at [facility] OR    TYMPANOSTOMY TUBE PLACEMENT Right 2020    EUSTACHIAN TUBE DILATION - RIGHT.

## 2024-06-18 ENCOUNTER — HOSPITAL ENCOUNTER (OUTPATIENT)
Dept: CT IMAGING | Age: 24
Discharge: HOME | End: 2024-06-18
Payer: COMMERCIAL

## 2024-06-18 DIAGNOSIS — R55 SYNCOPE, UNSPECIFIED SYNCOPE TYPE: ICD-10-CM

## 2024-06-18 DIAGNOSIS — R42 VERTIGO: ICD-10-CM

## 2024-06-18 PROCEDURE — 71275 CT ANGIOGRAPHY CHEST: CPT

## 2024-06-18 PROCEDURE — 6360000004 HC RX CONTRAST MEDICATION: Performed by: RADIOLOGY

## 2024-06-18 RX ADMIN — IOPAMIDOL 75 ML: 755 INJECTION, SOLUTION INTRAVENOUS at 08:50

## 2024-07-16 ENCOUNTER — TELEPHONE (OUTPATIENT)
Dept: ENT CLINIC | Age: 24
End: 2024-07-16

## 2024-07-16 NOTE — TELEPHONE ENCOUNTER
EST patient would like to reschedule missed 7/8/24  appt with Lul Wagoner.  Please contact patient to schedule.

## 2024-08-05 ENCOUNTER — OFFICE VISIT (OUTPATIENT)
Dept: ENT CLINIC | Age: 24
End: 2024-08-05
Payer: COMMERCIAL

## 2024-08-05 VITALS
SYSTOLIC BLOOD PRESSURE: 100 MMHG | HEART RATE: 98 BPM | DIASTOLIC BLOOD PRESSURE: 76 MMHG | HEIGHT: 62 IN | BODY MASS INDEX: 27.1 KG/M2 | WEIGHT: 147.3 LBS

## 2024-08-05 DIAGNOSIS — R55 SYNCOPE, UNSPECIFIED SYNCOPE TYPE: ICD-10-CM

## 2024-08-05 DIAGNOSIS — H91.8X3 ASYMMETRICAL HEARING LOSS: Primary | ICD-10-CM

## 2024-08-05 DIAGNOSIS — H90.71 MIXED CONDUCTIVE AND SENSORINEURAL HEARING LOSS OF RIGHT EAR WITH UNRESTRICTED HEARING OF LEFT EAR: ICD-10-CM

## 2024-08-05 DIAGNOSIS — R42 VERTIGO: ICD-10-CM

## 2024-08-05 PROCEDURE — 99214 OFFICE O/P EST MOD 30 MIN: CPT | Performed by: NURSE PRACTITIONER

## 2024-08-05 PROCEDURE — 1036F TOBACCO NON-USER: CPT | Performed by: NURSE PRACTITIONER

## 2024-08-05 PROCEDURE — G8427 DOCREV CUR MEDS BY ELIG CLIN: HCPCS | Performed by: NURSE PRACTITIONER

## 2024-08-05 PROCEDURE — G8417 CALC BMI ABV UP PARAM F/U: HCPCS | Performed by: NURSE PRACTITIONER

## 2024-08-05 ASSESSMENT — ENCOUNTER SYMPTOMS
RHINORRHEA: 0
RESPIRATORY NEGATIVE: 1
STRIDOR: 0
SHORTNESS OF BREATH: 0
EYES NEGATIVE: 1

## 2024-08-05 NOTE — PROGRESS NOTES
[facility]  Dr. Gonzalez Morejon, LEXX.O. Ms.Ed        Patient Name:  Dennis Mendez  :  2000     CHIEF C/O:    Chief Complaint   Patient presents with    Follow-up     1 mo CTA chest results (in epic) - complaints of frequent dizziness, has documented them and brought it for your review       HISTORY OBTAINED FROM:  patient    HISTORY OF PRESENT ILLNESS:       Dennis is a 24 y.o. year old female, here today for follow up of:       Dizziness and CT results.  Patient was last seen 6 weeks ago and underwent a CTA of the chest to rule out possible thoracic outlet syndrome in regards to her dizziness.  This test was negative.  Patient did keep a log of her symptoms since her last appointment with her symptoms being triggered by positional changes or strenuous activity.  It was also noted at her last appointment that she had a significant increase in heart rate with positional changes.  She denies any new ear pain or pressure.  She denies any sinus pain, pressure, congestion, rhinorrhea, postnasal drainage.           Past Medical History:   Diagnosis Date    ADHD (attention deficit hyperactivity disorder)     Anxiety     Asthma     Reactive airway disease     only when sick,       Past Surgical History:   Procedure Laterality Date    MYRINGOTOMY  2004    bilaterally    MYRINGOTOMY Right 10/04/2018    MYRINGOTOMY Right 2020    MYRINGOTOMY TUBE INSERTION performed by Gonzalez Morejon DO at [facility] OR    OTHER SURGICAL HISTORY Right 2020    eustatian tube dilation with myringtomy tube insertion    MO TYMPANOSTOMY GENERAL ANESTHESIA Right 10/4/2018    RIGHT MYRINGOTOMY TUBE INSERTION performed by Gonzalez Morejon DO at [facility] OR    TYMPANOSTOMY TUBE PLACEMENT Right 2020    EUSTACHIAN TUBE DILATION - RIGHT. WITH RIGHT MYRINGOTOMY TUBE PLACEMENT performed by Gonzalez Morejon DO at [facility] OR       Current Outpatient Medications:     Levonorgestrel (KYLEENA) IUD 19.5 mg, 1

## 2024-09-18 ENCOUNTER — HOSPITAL ENCOUNTER (OUTPATIENT)
Dept: AUDIOLOGY | Age: 24
Discharge: HOME | End: 2024-09-18
Payer: COMMERCIAL

## 2024-09-18 PROCEDURE — 92537 CALORIC VSTBLR TEST W/REC: CPT | Performed by: AUDIOLOGIST

## 2024-09-18 PROCEDURE — 92540 BASIC VESTIBULAR EVALUATION: CPT | Performed by: AUDIOLOGIST

## 2024-10-10 ENCOUNTER — OFFICE VISIT (OUTPATIENT)
Dept: ENT CLINIC | Age: 24
End: 2024-10-10
Payer: COMMERCIAL

## 2024-10-10 ENCOUNTER — PROCEDURE VISIT (OUTPATIENT)
Dept: AUDIOLOGY | Age: 24
End: 2024-10-10
Payer: COMMERCIAL

## 2024-10-10 VITALS
SYSTOLIC BLOOD PRESSURE: 112 MMHG | WEIGHT: 144 LBS | HEIGHT: 62 IN | HEART RATE: 103 BPM | BODY MASS INDEX: 26.5 KG/M2 | DIASTOLIC BLOOD PRESSURE: 73 MMHG

## 2024-10-10 DIAGNOSIS — R90.89 ABNORMAL BRAIN MRI: ICD-10-CM

## 2024-10-10 DIAGNOSIS — R42 VERTIGO: Primary | ICD-10-CM

## 2024-10-10 DIAGNOSIS — H93.8X3 EAR FULLNESS, BILATERAL: Primary | ICD-10-CM

## 2024-10-10 PROCEDURE — 92567 TYMPANOMETRY: CPT

## 2024-10-10 PROCEDURE — 1036F TOBACCO NON-USER: CPT | Performed by: NURSE PRACTITIONER

## 2024-10-10 PROCEDURE — G8417 CALC BMI ABV UP PARAM F/U: HCPCS | Performed by: NURSE PRACTITIONER

## 2024-10-10 PROCEDURE — G8427 DOCREV CUR MEDS BY ELIG CLIN: HCPCS | Performed by: NURSE PRACTITIONER

## 2024-10-10 PROCEDURE — G8484 FLU IMMUNIZE NO ADMIN: HCPCS | Performed by: NURSE PRACTITIONER

## 2024-10-10 PROCEDURE — 99213 OFFICE O/P EST LOW 20 MIN: CPT | Performed by: NURSE PRACTITIONER

## 2024-10-10 ASSESSMENT — ENCOUNTER SYMPTOMS
SHORTNESS OF BREATH: 0
EYES NEGATIVE: 1
RHINORRHEA: 0
RESPIRATORY NEGATIVE: 1
STRIDOR: 0

## 2024-10-10 NOTE — PROGRESS NOTES
This patient was referred for tympanometric testing by HANY Perez due to ear fullness. Patient reported that her ears have felt full since VNG caloric testing and since weather changes. Denied any changes in hearing at this time.    Tympanometry revealed normal middle ear peak pressure and compliance, bilaterally.    The results were reviewed with the patient and ordering provider.     Recommendations for follow up will be made pending physician consult.      Dylan Gibson, CCC-A  Clinical Audiologist  [place] License: [id number]    Electronically signed by Jono Payne on 10/10/2024 at 2:26 PM

## 2024-10-10 NOTE — PROGRESS NOTES
[facility]  Dr. Gonzalez Morejon, LEXX.O. Ms.Ed        Patient Name:  Dennis Mendez  :  2000     CHIEF C/O:    Chief Complaint   Patient presents with    Follow-up     2 month follow up on MRI results and VNG. Patient reports she is doing the same since last appointment.        HISTORY OBTAINED FROM:  patient    HISTORY OF PRESENT ILLNESS:       Dennis is a 24 y.o. year old female, here today for follow up of:       Vertigo with MRI of the IAC and VNG.  Patient was last seen 6 weeks ago and underwent an VNG that suggested possible central cause for her vertigo symptoms.  Also had a follow-up MRI for possible acoustic neuroma from previous study which was negative however there were mild white matter changes in the frontal lobe.  Patient continues to have episodic dizziness especially when straining or changing positions.  She denies any noticeable changes to her hearing.  She does note some pressure sensation in both ears over the past several days but does states she has TMJ and has been stressed with some teeth clenching.  She denies any congestion, rhinorrhea, postnasal drainage.  She denies any sinus pain or pressure.  Denies any recent fevers or recent antibiotics.           Past Medical History:   Diagnosis Date    ADHD (attention deficit hyperactivity disorder)     Anxiety     Asthma     Reactive airway disease     only when sick,       Past Surgical History:   Procedure Laterality Date    MYRINGOTOMY  2004    bilaterally    MYRINGOTOMY Right 10/04/2018    MYRINGOTOMY Right 2020    MYRINGOTOMY TUBE INSERTION performed by Gonzalez Morejon DO at [facility] OR    OTHER SURGICAL HISTORY Right 2020    eustatian tube dilation with myringtomy tube insertion    MT TYMPANOSTOMY GENERAL ANESTHESIA Right 10/4/2018    RIGHT MYRINGOTOMY TUBE INSERTION performed by Gonzalez Morejon DO at [facility] OR    TYMPANOSTOMY TUBE PLACEMENT Right 2020    EUSTACHIAN TUBE DILATION - RIGHT. WITH

## 2024-10-17 ENCOUNTER — OFFICE VISIT (OUTPATIENT)
Dept: CARDIOLOGY CLINIC | Age: 24
End: 2024-10-17

## 2024-10-17 VITALS
DIASTOLIC BLOOD PRESSURE: 58 MMHG | RESPIRATION RATE: 16 BRPM | HEART RATE: 96 BPM | BODY MASS INDEX: 27.38 KG/M2 | HEIGHT: 61 IN | SYSTOLIC BLOOD PRESSURE: 102 MMHG | WEIGHT: 145 LBS

## 2024-10-17 DIAGNOSIS — R42 DIZZINESS: ICD-10-CM

## 2024-10-17 DIAGNOSIS — I51.9 HEART PROBLEM: Primary | ICD-10-CM

## 2024-10-17 NOTE — PROGRESS NOTES
[facility] consult  Dr. Aditi Zambrano      Reason for Consult: Dizziness, palpitations  Referring Physician: Felice Herrera MD     CHIEF COMPLAINT:   Chief Complaint   Patient presents with    Consultation     NP per Rizwana CALDERON syncope        HISTORY OF PRESENT ILLNESS:   Patient is 24 years old female with ADHD, asymmetric hearing loss, was referred to cardiology for evaluation of dizziness and palpitations.  Occasional dizziness, mostly orthostatic, occasional palpitations, no syncopal episodes, no pedal edema, no PND, no orthopnea, functional capacity is at baseline      Past Medical History:   Diagnosis Date    ADHD (attention deficit hyperactivity disorder)     Anxiety     Asthma     Reactive airway disease     only when sick,           Past Surgical History:   Procedure Laterality Date    MYRINGOTOMY  2004    bilaterally    MYRINGOTOMY Right 10/04/2018    MYRINGOTOMY Right 7/23/2020    MYRINGOTOMY TUBE INSERTION performed by Gonzalez Morejon DO at [facility] OR    OTHER SURGICAL HISTORY Right 07/23/2020    eustatian tube dilation with myringtomy tube insertion    ME TYMPANOSTOMY GENERAL ANESTHESIA Right 10/4/2018    RIGHT MYRINGOTOMY TUBE INSERTION performed by Gonzalez Morejon DO at [facility] OR    TYMPANOSTOMY TUBE PLACEMENT Right 7/23/2020    EUSTACHIAN TUBE DILATION - RIGHT. WITH RIGHT MYRINGOTOMY TUBE PLACEMENT performed by Gonzalez Morejon DO at [facility] OR         Current Outpatient Medications   Medication Sig Dispense Refill    Levonorgestrel (KYLEENA) IUD 19.5 mg 1 each by IntraUTERine route once PLACED 12/13/2023      diphenhydrAMINE (BENADRYL) 25 MG capsule Take 1 capsule by mouth every 6 hours as needed for Itching      cloNIDine (CATAPRES) 0.1 MG tablet Take 1 tablet by mouth 2 times daily 1/2 tab in morning and 1 tab in evening      ibuprofen (ADVIL;MOTRIN) 400 MG tablet Take 1 tablet by mouth every 6 hours as needed for Pain      amphetamine-dextroamphetamine (ADDERALL XR) 20 MG

## 2024-11-01 ENCOUNTER — HOSPITAL ENCOUNTER (OUTPATIENT)
Dept: CARDIOLOGY | Age: 24
Discharge: HOME | End: 2024-11-03
Attending: INTERNAL MEDICINE
Payer: COMMERCIAL

## 2024-11-01 VITALS — WEIGHT: 145 LBS | BODY MASS INDEX: 27.38 KG/M2 | HEIGHT: 61 IN

## 2024-11-01 DIAGNOSIS — R42 DIZZINESS: ICD-10-CM

## 2024-11-01 LAB
ECHO AO ASC DIAM: 2.6 CM
ECHO AO ASCENDING AORTA INDEX: 1.58 CM/M2
ECHO AV AREA PEAK VELOCITY: 2.5 CM2
ECHO AV AREA VTI: 2.3 CM2
ECHO AV AREA/BSA PEAK VELOCITY: 1.5 CM2/M2
ECHO AV AREA/BSA VTI: 1.4 CM2/M2
ECHO AV CUSP MM: 2.1 CM
ECHO AV MEAN GRADIENT: 5 MMHG
ECHO AV MEAN VELOCITY: 1.1 M/S
ECHO AV PEAK GRADIENT: 9 MMHG
ECHO AV PEAK VELOCITY: 1.5 M/S
ECHO AV VELOCITY RATIO: 0.8
ECHO AV VTI: 34.9 CM
ECHO BSA: 1.68 M2
ECHO LA DIAMETER INDEX: 1.94 CM/M2
ECHO LA DIAMETER: 3.2 CM
ECHO LA VOL A-L A2C: 34 ML (ref 22–52)
ECHO LA VOL A-L A4C: 38 ML (ref 22–52)
ECHO LA VOL MOD A2C: 32 ML (ref 22–52)
ECHO LA VOL MOD A4C: 36 ML (ref 22–52)
ECHO LA VOLUME AREA LENGTH: 38 ML
ECHO LA VOLUME INDEX A-L A2C: 21 ML/M2 (ref 16–34)
ECHO LA VOLUME INDEX A-L A4C: 23 ML/M2 (ref 16–34)
ECHO LA VOLUME INDEX AREA LENGTH: 23 ML/M2 (ref 16–34)
ECHO LA VOLUME INDEX MOD A2C: 19 ML/M2 (ref 16–34)
ECHO LA VOLUME INDEX MOD A4C: 22 ML/M2 (ref 16–34)
ECHO LV FRACTIONAL SHORTENING: 32 % (ref 28–44)
ECHO LV INTERNAL DIMENSION DIASTOLE INDEX: 2.24 CM/M2
ECHO LV INTERNAL DIMENSION DIASTOLIC: 3.7 CM (ref 3.9–5.3)
ECHO LV INTERNAL DIMENSION SYSTOLIC INDEX: 1.52 CM/M2
ECHO LV INTERNAL DIMENSION SYSTOLIC: 2.5 CM
ECHO LV ISOVOLUMETRIC RELAXATION TIME (IVRT): 86.5 MS
ECHO LV IVSD: 1 CM (ref 0.6–0.9)
ECHO LV IVSS: 1.3 CM
ECHO LV MASS 2D: 104.6 G (ref 67–162)
ECHO LV MASS INDEX 2D: 63.4 G/M2 (ref 43–95)
ECHO LV POSTERIOR WALL DIASTOLIC: 0.9 CM (ref 0.6–0.9)
ECHO LV POSTERIOR WALL SYSTOLIC: 1.2 CM
ECHO LV RELATIVE WALL THICKNESS RATIO: 0.49
ECHO LVOT AREA: 3.1 CM2
ECHO LVOT AV VTI INDEX: 0.77
ECHO LVOT DIAM: 2 CM
ECHO LVOT MEAN GRADIENT: 3 MMHG
ECHO LVOT PEAK GRADIENT: 6 MMHG
ECHO LVOT PEAK VELOCITY: 1.2 M/S
ECHO LVOT STROKE VOLUME INDEX: 51 ML/M2
ECHO LVOT SV: 84.2 ML
ECHO LVOT VTI: 26.8 CM
ECHO MV "A" WAVE DURATION: 128 MSEC
ECHO MV A VELOCITY: 0.55 M/S
ECHO MV AREA PHT: 2.2 CM2
ECHO MV AREA VTI: 2.3 CM2
ECHO MV E DECELERATION TIME (DT): 269.5 MS
ECHO MV E VELOCITY: 1.01 M/S
ECHO MV E/A RATIO: 1.84
ECHO MV LVOT VTI INDEX: 1.37
ECHO MV MAX VELOCITY: 1.1 M/S
ECHO MV MEAN GRADIENT: 2 MMHG
ECHO MV MEAN VELOCITY: 0.7 M/S
ECHO MV PEAK GRADIENT: 5 MMHG
ECHO MV PRESSURE HALF TIME (PHT): 98.5 MS
ECHO MV VTI: 36.7 CM
ECHO PV MAX VELOCITY: 1 M/S
ECHO PV MEAN GRADIENT: 2 MMHG
ECHO PV MEAN VELOCITY: 0.7 M/S
ECHO PV PEAK GRADIENT: 4 MMHG
ECHO PV VTI: 23.2 CM
ECHO PVEIN A DURATION: 100.4 MS
ECHO PVEIN A VELOCITY: 0.3 M/S
ECHO PVEIN PEAK D VELOCITY: 0.5 M/S
ECHO PVEIN PEAK S VELOCITY: 0.6 M/S
ECHO PVEIN S/D RATIO: 1.2
ECHO RV INTERNAL DIMENSION: 2.5 CM

## 2024-11-01 PROCEDURE — 93306 TTE W/DOPPLER COMPLETE: CPT

## 2024-11-14 LAB
ECHO AO ASC DIAM: 2.6 CM
ECHO AO ASCENDING AORTA INDEX: 1.58 CM/M2
ECHO AV AREA PEAK VELOCITY: 2.5 CM2
ECHO AV AREA VTI: 2.3 CM2
ECHO AV AREA/BSA PEAK VELOCITY: 1.5 CM2/M2
ECHO AV AREA/BSA VTI: 1.4 CM2/M2
ECHO AV CUSP MM: 2.1 CM
ECHO AV MEAN GRADIENT: 5 MMHG
ECHO AV MEAN VELOCITY: 1.1 M/S
ECHO AV PEAK GRADIENT: 9 MMHG
ECHO AV PEAK VELOCITY: 1.5 M/S
ECHO AV VELOCITY RATIO: 0.8
ECHO AV VTI: 34.9 CM
ECHO BSA: 1.68 M2
ECHO LA DIAMETER INDEX: 1.94 CM/M2
ECHO LA DIAMETER: 3.2 CM
ECHO LA VOL A-L A2C: 34 ML (ref 22–52)
ECHO LA VOL A-L A4C: 38 ML (ref 22–52)
ECHO LA VOL MOD A2C: 32 ML (ref 22–52)
ECHO LA VOL MOD A4C: 36 ML (ref 22–52)
ECHO LA VOLUME AREA LENGTH: 38 ML
ECHO LA VOLUME INDEX A-L A2C: 21 ML/M2 (ref 16–34)
ECHO LA VOLUME INDEX A-L A4C: 23 ML/M2 (ref 16–34)
ECHO LA VOLUME INDEX AREA LENGTH: 23 ML/M2 (ref 16–34)
ECHO LA VOLUME INDEX MOD A2C: 19 ML/M2 (ref 16–34)
ECHO LA VOLUME INDEX MOD A4C: 22 ML/M2 (ref 16–34)
ECHO LV EF PHYSICIAN: 65 %
ECHO LV FRACTIONAL SHORTENING: 32 % (ref 28–44)
ECHO LV INTERNAL DIMENSION DIASTOLE INDEX: 2.24 CM/M2
ECHO LV INTERNAL DIMENSION DIASTOLIC: 3.7 CM (ref 3.9–5.3)
ECHO LV INTERNAL DIMENSION SYSTOLIC INDEX: 1.52 CM/M2
ECHO LV INTERNAL DIMENSION SYSTOLIC: 2.5 CM
ECHO LV ISOVOLUMETRIC RELAXATION TIME (IVRT): 86.5 MS
ECHO LV IVSD: 1 CM (ref 0.6–0.9)
ECHO LV IVSS: 1.3 CM
ECHO LV MASS 2D: 104.6 G (ref 67–162)
ECHO LV MASS INDEX 2D: 63.4 G/M2 (ref 43–95)
ECHO LV POSTERIOR WALL DIASTOLIC: 0.9 CM (ref 0.6–0.9)
ECHO LV POSTERIOR WALL SYSTOLIC: 1.2 CM
ECHO LV RELATIVE WALL THICKNESS RATIO: 0.49
ECHO LVOT AREA: 3.1 CM2
ECHO LVOT AV VTI INDEX: 0.77
ECHO LVOT DIAM: 2 CM
ECHO LVOT MEAN GRADIENT: 3 MMHG
ECHO LVOT PEAK GRADIENT: 6 MMHG
ECHO LVOT PEAK VELOCITY: 1.2 M/S
ECHO LVOT STROKE VOLUME INDEX: 51 ML/M2
ECHO LVOT SV: 84.2 ML
ECHO LVOT VTI: 26.8 CM
ECHO MV "A" WAVE DURATION: 128 MSEC
ECHO MV A VELOCITY: 0.55 M/S
ECHO MV AREA PHT: 2.2 CM2
ECHO MV AREA VTI: 2.3 CM2
ECHO MV E DECELERATION TIME (DT): 269.5 MS
ECHO MV E VELOCITY: 1.01 M/S
ECHO MV E/A RATIO: 1.84
ECHO MV LVOT VTI INDEX: 1.37
ECHO MV MAX VELOCITY: 1.1 M/S
ECHO MV MEAN GRADIENT: 2 MMHG
ECHO MV MEAN VELOCITY: 0.7 M/S
ECHO MV PEAK GRADIENT: 5 MMHG
ECHO MV PRESSURE HALF TIME (PHT): 98.5 MS
ECHO MV VTI: 36.7 CM
ECHO PV MAX VELOCITY: 1 M/S
ECHO PV MEAN GRADIENT: 2 MMHG
ECHO PV MEAN VELOCITY: 0.7 M/S
ECHO PV PEAK GRADIENT: 4 MMHG
ECHO PV VTI: 23.2 CM
ECHO PVEIN A DURATION: 100.4 MS
ECHO PVEIN A VELOCITY: 0.3 M/S
ECHO PVEIN PEAK D VELOCITY: 0.5 M/S
ECHO PVEIN PEAK S VELOCITY: 0.6 M/S
ECHO PVEIN S/D RATIO: 1.2
ECHO RV INTERNAL DIMENSION: 2.5 CM

## 2024-11-20 DIAGNOSIS — R42 DIZZINESS: ICD-10-CM

## 2024-11-25 ENCOUNTER — TELEPHONE (OUTPATIENT)
Dept: CARDIOLOGY CLINIC | Age: 24
End: 2024-11-25

## 2024-11-25 NOTE — TELEPHONE ENCOUNTER
Aditi Zambrano MD McGee, Shelia, MA Hi Shelia:  Would you please let her know that her monitor was unremarkable, nothing to explain her dizziness  Thank you      Called patient gave result

## 2025-04-17 ENCOUNTER — PROCEDURE VISIT (OUTPATIENT)
Dept: AUDIOLOGY | Age: 25
End: 2025-04-17
Payer: COMMERCIAL

## 2025-04-17 ENCOUNTER — OFFICE VISIT (OUTPATIENT)
Dept: ENT CLINIC | Age: 25
End: 2025-04-17
Payer: COMMERCIAL

## 2025-04-17 VITALS
HEIGHT: 62 IN | DIASTOLIC BLOOD PRESSURE: 74 MMHG | WEIGHT: 145 LBS | TEMPERATURE: 97.8 F | SYSTOLIC BLOOD PRESSURE: 118 MMHG | HEART RATE: 90 BPM | BODY MASS INDEX: 26.68 KG/M2

## 2025-04-17 DIAGNOSIS — H90.3 SENSORINEURAL HEARING LOSS (SNHL) OF BOTH EARS: Primary | ICD-10-CM

## 2025-04-17 DIAGNOSIS — H90.5 SENSORINEURAL HEARING LOSS (SNHL), UNSPECIFIED LATERALITY: Primary | ICD-10-CM

## 2025-04-17 PROCEDURE — 1036F TOBACCO NON-USER: CPT | Performed by: NURSE PRACTITIONER

## 2025-04-17 PROCEDURE — G8417 CALC BMI ABV UP PARAM F/U: HCPCS | Performed by: NURSE PRACTITIONER

## 2025-04-17 PROCEDURE — 92567 TYMPANOMETRY: CPT | Performed by: AUDIOLOGIST

## 2025-04-17 PROCEDURE — G8427 DOCREV CUR MEDS BY ELIG CLIN: HCPCS | Performed by: NURSE PRACTITIONER

## 2025-04-17 PROCEDURE — 92557 COMPREHENSIVE HEARING TEST: CPT | Performed by: AUDIOLOGIST

## 2025-04-17 PROCEDURE — 99213 OFFICE O/P EST LOW 20 MIN: CPT | Performed by: NURSE PRACTITIONER

## 2025-04-17 ASSESSMENT — ENCOUNTER SYMPTOMS
SHORTNESS OF BREATH: 0
RHINORRHEA: 0
STRIDOR: 0
RESPIRATORY NEGATIVE: 1
EYES NEGATIVE: 1

## 2025-04-17 NOTE — PROGRESS NOTES
DEPARTMENT OF OTOLARYNGOLOGY  Dr. Gonzalez Morejon D.O., Ms. Ed.  Dr. Marcelo Herndon D.O.  Lul Wagoner, MSN, FNP-C        Patient Name:  Dennis Mendez  :  2000     CHIEF C/O:    Chief Complaint   Patient presents with    Follow-up     6mo f/u with audio patient states that she feels her hearing has gotten slightly worse, and is having R ear pain on and off       HISTORY OBTAINED FROM:  patient    HISTORY OF PRESENT ILLNESS:       Dennis is a 24 y.o. year old female, here today for follow up of:       History of Present Illness  The patient presents for evaluation of hearing loss, ear pain, and dizziness.    She reports a perceived slight deterioration in her auditory function, although she acknowledges the possibility of this being a subjective interpretation. She has been utilizing bilateral hearing aids since  and continues to find them beneficial. She is not currently using any nasal sprays or other treatments.    She experiences intermittent otalgia on one side. She does not report any increase in bruxism or teeth grinding beyond her usual habits.    Her vertiginous symptoms remain unchanged, and she has an upcoming appointment with a neurologist next week.        Past Medical History:   Diagnosis Date    ADHD (attention deficit hyperactivity disorder)     Anxiety     Asthma     Reactive airway disease     only when sick,       Past Surgical History:   Procedure Laterality Date    MYRINGOTOMY      bilaterally    MYRINGOTOMY Right 10/04/2018    MYRINGOTOMY Right 2020    MYRINGOTOMY TUBE INSERTION performed by Gonzalez Morejon DO at [facility] OR    OTHER SURGICAL HISTORY Right 2020    eustatian tube dilation with myringtomy tube insertion    VA TYMPANOSTOMY GENERAL ANESTHESIA Right 10/4/2018    RIGHT MYRINGOTOMY TUBE INSERTION performed by Gonzalez Morejon DO at [facility] OR    TYMPANOSTOMY TUBE PLACEMENT Right 2020    EUSTACHIAN TUBE DILATION - RIGHT. WITH RIGHT

## 2025-04-18 NOTE — PROGRESS NOTES
This patient was referred for audiometric and tympanometric testing by HANY Perez due to  history of hearing loss and hearing aid use .     Audiometry using pure tone air and bone conduction testing revealed a mild low frequency sensorineural loss rising to normal haring for the higher frequencies, bilaterally. Reliability was good. Speech reception thresholds were in good agreement with the pure tone averages, bilaterally. Speech discrimination scores were excellent, bilaterally.    Tympanometry revealed normal middle ear peak pressure and compliance, bilaterally    The results were reviewed with the patient. Hearing aids were cleaned and checked.     Recommendations for follow up will be made pending physician consult.    Antonia Moran M.A., CCC/A  [place] Lic [id number]  Electronically signed by Jono Jacobo on 4/18/2025 at 8:55 AM

## (undated) DEVICE — KNIFE SURG EAR S STL SHFT SCKL BLDE W/ POLYPR FLAT HNDL 6/PK

## (undated) DEVICE — SLEEVE COMPRESS STD CALF KNEE SCD

## (undated) DEVICE — ANTI-FOG SOLUTION WITH FOAM PAD: Brand: DEVON

## (undated) DEVICE — SYRINGE TB 1ML NDL 27GA L0.5IN PLAS W/ SFTY LOK PERM NDL

## (undated) DEVICE — SYSTEM BLLN DIL L16MM DIA6MM FOR EUSTACHIAN TB

## (undated) DEVICE — MICRO TIP WIPE: Brand: DEVON

## (undated) DEVICE — HEAD AND NECK PACK: Brand: CONVERTORS

## (undated) DEVICE — SOLUTION IRRIG 1000ML 09% SOD CHL USP PIC PLAS CONTAINER

## (undated) DEVICE — STERILE POLYISOPRENE POWDER-FREE SURGICAL GLOVES WITH EMOLLIENT COATING: Brand: PROTEXIS

## (undated) DEVICE — MEDI-VAC NON-CONDUCTIVE SUCTION TUBING: Brand: CARDINAL HEALTH

## (undated) DEVICE — NEEDLE HYPO 18GA L1.5IN PNK POLYPR HUB S STL THN WALL FILL

## (undated) DEVICE — PEN: MARKING STD 100/CS: Brand: MEDICAL ACTION INDUSTRIES

## (undated) DEVICE — DEVICE INFL PRSS G INDIC DISP (MUST BE PURC IN MULTIPLES OF 5)

## (undated) DEVICE — TOWEL OR BLUEE 16X26IN ST 8 PACK ORB08 16X26ORTWL

## (undated) DEVICE — PACKING 8004007 NEURAY 200PK 13X76MM: Brand: NEURAY ®

## (undated) DEVICE — 1810 FOAM BLOCK NEEDLE COUNTER: Brand: DEVON

## (undated) DEVICE — GAUZE,SPONGE,4"X4",16PLY,XRAY,STRL,LF: Brand: MEDLINE

## (undated) DEVICE — 12 ML SYRINGE,LUER-LOCK TIP: Brand: MONOJECT